# Patient Record
Sex: FEMALE | HISPANIC OR LATINO | URBAN - METROPOLITAN AREA
[De-identification: names, ages, dates, MRNs, and addresses within clinical notes are randomized per-mention and may not be internally consistent; named-entity substitution may affect disease eponyms.]

---

## 2017-01-05 ENCOUNTER — OFFICE (OUTPATIENT)
Dept: URBAN - METROPOLITAN AREA CLINIC 35 | Facility: CLINIC | Age: 67
Setting detail: OPHTHALMOLOGY
End: 2017-01-05
Payer: MEDICARE

## 2017-01-05 DIAGNOSIS — H01.004: ICD-10-CM

## 2017-01-05 DIAGNOSIS — H01.001: ICD-10-CM

## 2017-01-05 PROBLEM — H10.9 CONJUNCTIVITIS, UNSPECIFIED: Status: RESOLVED | Noted: 2017-01-05 | Resolved: 2017-01-05

## 2017-01-05 PROCEDURE — 92014 COMPRE OPH EXAM EST PT 1/>: CPT | Performed by: OPHTHALMOLOGY

## 2017-01-05 ASSESSMENT — REFRACTION_AUTOREFRACTION
OD_SPHERE: +3.25
OD_AXIS: 176
OS_CYLINDER: +1.50
OD_CYLINDER: +1.25
OS_SPHERE: +3.25
OS_AXIS: 6

## 2017-01-05 ASSESSMENT — REFRACTION_MANIFEST
OD_VA1: 20/20-2
OU_VA: 20/
OS_VA3: 20/
OD_SPHERE: +3.25
OS_SPHERE: +3.75
OS_VA1: 20/
OD_VA1: 20/
OS_CYLINDER: +1.25
OD_VA2: 20/
OU_VA: 20/
OS_VA3: 20/
OD_VA1: 20/
OS_VA1: 20/25
OU_VA: 20/
OS_VA2: 20/
OD_VA3: 20/
OD_AXIS: 180
OD_VA3: 20/
OS_AXIS: 5
OD_VA2: 20/
OD_VA2: 20/
OS_VA1: 20/
OS_VA3: 20/
OS_VA2: 20/
OD_VA3: 20/
OD_CYLINDER: +1.00
OS_VA2: 20/

## 2017-01-05 ASSESSMENT — REFRACTION_CURRENTRX
OS_OVR_VA: 20/
OD_OVR_VA: 20/
OD_OVR_VA: 20/
OD_SPHERE: +3.25
OS_AXIS: 13
OD_CYLINDER: +1.00
OS_OVR_VA: 20/
OD_AXIS: 178
OS_ADD: +3.00
OS_VPRISM_DIRECTION: PROGS
OD_ADD: +3.00
OS_SPHERE: +3.25
OS_OVR_VA: 20/
OD_VPRISM_DIRECTION: PROGS
OD_OVR_VA: 20/
OS_CYLINDER: +0.75

## 2017-01-05 ASSESSMENT — KERATOMETRY
OS_AXISANGLE_DEGREES: 41
OD_K1POWER_DIOPTERS: 44.50
METHOD_AUTO_MANUAL: AUTO
OD_AXISANGLE_DEGREES: 171
OD_K2POWER_DIOPTERS: 44.75
OS_K1POWER_DIOPTERS: 44.50
OS_K2POWER_DIOPTERS: 45.00

## 2017-01-05 ASSESSMENT — LID EXAM ASSESSMENTS
OS_BLEPHARITIS: LUL T
OD_BLEPHARITIS: RUL T

## 2017-01-05 ASSESSMENT — AXIALLENGTH_DERIVED
OS_AL: 21.61
OS_AL: 21.7333
OD_AL: 21.8556
OD_AL: 21.8139

## 2017-01-05 ASSESSMENT — CONFRONTATIONAL VISUAL FIELD TEST (CVF)
OD_FINDINGS: FULL
OS_FINDINGS: FULL

## 2017-01-05 ASSESSMENT — VISUAL ACUITY
OD_BCVA: 20/20-
OS_BCVA: 20/25+2

## 2017-01-05 ASSESSMENT — SPHEQUIV_DERIVED
OD_SPHEQUIV: 3.75
OS_SPHEQUIV: 4
OS_SPHEQUIV: 4.375
OD_SPHEQUIV: 3.875

## 2017-09-07 ENCOUNTER — HOSPITAL ENCOUNTER (EMERGENCY)
Facility: HOSPITAL | Age: 67
Discharge: HOME | End: 2017-09-07
Attending: EMERGENCY MEDICINE | Admitting: EMERGENCY MEDICINE

## 2017-09-07 ENCOUNTER — APPOINTMENT (EMERGENCY)
Dept: CT IMAGING | Facility: HOSPITAL | Age: 67
End: 2017-09-07

## 2017-09-07 VITALS
WEIGHT: 120 LBS | BODY MASS INDEX: 22.66 KG/M2 | OXYGEN SATURATION: 99 % | TEMPERATURE: 98.3 F | RESPIRATION RATE: 16 BRPM | SYSTOLIC BLOOD PRESSURE: 170 MMHG | HEART RATE: 65 BPM | HEIGHT: 61 IN | DIASTOLIC BLOOD PRESSURE: 81 MMHG

## 2017-09-07 DIAGNOSIS — M53.3 SACROILIAC JOINT PAIN: Primary | ICD-10-CM

## 2017-09-07 DIAGNOSIS — I10 HTN (HYPERTENSION): ICD-10-CM

## 2017-09-07 LAB
ANION GAP SERPL CALCULATED.3IONS-SCNC: 1 MMOL/L (ref 4–13)
ATRIAL RATE: 79 BPM
BASOPHILS # BLD AUTO: 0.01 THOUSANDS/ΜL (ref 0–0.1)
BASOPHILS NFR BLD AUTO: 0 % (ref 0–1)
BILIRUB UR QL STRIP: NEGATIVE
BUN SERPL-MCNC: 15 MG/DL (ref 5–25)
CALCIUM SERPL-MCNC: 8.8 MG/DL (ref 8.3–10.1)
CHLORIDE SERPL-SCNC: 104 MMOL/L (ref 100–108)
CLARITY UR: CLEAR
CO2 SERPL-SCNC: 32 MMOL/L (ref 21–32)
COLOR UR: YELLOW
COLOR, POC: YELLOW
CREAT SERPL-MCNC: 0.88 MG/DL (ref 0.6–1.3)
EOSINOPHIL # BLD AUTO: 0.13 THOUSAND/ΜL (ref 0–0.61)
EOSINOPHIL NFR BLD AUTO: 3 % (ref 0–6)
ERYTHROCYTE [DISTWIDTH] IN BLOOD BY AUTOMATED COUNT: 13 % (ref 11.6–15.1)
GFR SERPL CREATININE-BSD FRML MDRD: 68 ML/MIN/1.73SQ M
GLUCOSE SERPL-MCNC: 86 MG/DL (ref 65–140)
GLUCOSE UR STRIP-MCNC: NEGATIVE MG/DL
HCT VFR BLD AUTO: 38.8 % (ref 34.8–46.1)
HGB BLD-MCNC: 13.7 G/DL (ref 11.5–15.4)
HGB UR QL STRIP.AUTO: NEGATIVE
KETONES UR STRIP-MCNC: NEGATIVE MG/DL
LEUKOCYTE ESTERASE UR QL STRIP: NEGATIVE
LYMPHOCYTES # BLD AUTO: 1.57 THOUSANDS/ΜL (ref 0.6–4.47)
LYMPHOCYTES NFR BLD AUTO: 31 % (ref 14–44)
MCH RBC QN AUTO: 32.5 PG (ref 26.8–34.3)
MCHC RBC AUTO-ENTMCNC: 35.3 G/DL (ref 31.4–37.4)
MCV RBC AUTO: 92 FL (ref 82–98)
MONOCYTES # BLD AUTO: 0.47 THOUSAND/ΜL (ref 0.17–1.22)
MONOCYTES NFR BLD AUTO: 9 % (ref 4–12)
NEUTROPHILS # BLD AUTO: 2.97 THOUSANDS/ΜL (ref 1.85–7.62)
NEUTS SEG NFR BLD AUTO: 57 % (ref 43–75)
NITRITE UR QL STRIP: NEGATIVE
NRBC BLD AUTO-RTO: 0 /100 WBCS
P AXIS: 67 DEGREES
PH UR STRIP.AUTO: 7 [PH] (ref 4.5–8)
PLATELET # BLD AUTO: 199 THOUSANDS/UL (ref 149–390)
PMV BLD AUTO: 11.6 FL (ref 8.9–12.7)
POTASSIUM SERPL-SCNC: 4.1 MMOL/L (ref 3.5–5.3)
POTASSIUM SERPL-SCNC: 5.7 MMOL/L (ref 3.5–5.3)
PR INTERVAL: 122 MS
PROT UR STRIP-MCNC: NEGATIVE MG/DL
QRS AXIS: 87 DEGREES
QRSD INTERVAL: 72 MS
QT INTERVAL: 386 MS
QTC INTERVAL: 442 MS
RBC # BLD AUTO: 4.22 MILLION/UL (ref 3.81–5.12)
SODIUM SERPL-SCNC: 137 MMOL/L (ref 136–145)
SP GR UR STRIP.AUTO: 1.02 (ref 1–1.03)
T WAVE AXIS: 71 DEGREES
UROBILINOGEN UR QL STRIP.AUTO: 0.2 E.U./DL
VENTRICULAR RATE: 79 BPM
WBC # BLD AUTO: 5.15 THOUSAND/UL (ref 4.31–10.16)

## 2017-09-07 PROCEDURE — 36415 COLL VENOUS BLD VENIPUNCTURE: CPT | Performed by: FAMILY MEDICINE

## 2017-09-07 PROCEDURE — 74177 CT ABD & PELVIS W/CONTRAST: CPT

## 2017-09-07 PROCEDURE — 93005 ELECTROCARDIOGRAM TRACING: CPT | Performed by: FAMILY MEDICINE

## 2017-09-07 PROCEDURE — 99284 EMERGENCY DEPT VISIT MOD MDM: CPT

## 2017-09-07 PROCEDURE — 96374 THER/PROPH/DIAG INJ IV PUSH: CPT

## 2017-09-07 PROCEDURE — 81003 URINALYSIS AUTO W/O SCOPE: CPT

## 2017-09-07 PROCEDURE — 81002 URINALYSIS NONAUTO W/O SCOPE: CPT | Performed by: FAMILY MEDICINE

## 2017-09-07 PROCEDURE — 84132 ASSAY OF SERUM POTASSIUM: CPT | Performed by: FAMILY MEDICINE

## 2017-09-07 PROCEDURE — 85025 COMPLETE CBC W/AUTO DIFF WBC: CPT | Performed by: FAMILY MEDICINE

## 2017-09-07 PROCEDURE — 80048 BASIC METABOLIC PNL TOTAL CA: CPT | Performed by: FAMILY MEDICINE

## 2017-09-07 RX ORDER — NAPROXEN 500 MG/1
500 TABLET ORAL 2 TIMES DAILY WITH MEALS
Qty: 10 TABLET | Refills: 0 | Status: SHIPPED | OUTPATIENT
Start: 2017-09-07 | End: 2017-12-18

## 2017-09-07 RX ORDER — HYDROCHLOROTHIAZIDE 12.5 MG/1
12.5 TABLET ORAL DAILY
COMMUNITY
End: 2017-12-18

## 2017-09-07 RX ORDER — KETOROLAC TROMETHAMINE 30 MG/ML
15 INJECTION, SOLUTION INTRAMUSCULAR; INTRAVENOUS ONCE
Status: COMPLETED | OUTPATIENT
Start: 2017-09-07 | End: 2017-09-07

## 2017-09-07 RX ORDER — ASPIRIN 81 MG/1
81 TABLET ORAL DAILY
COMMUNITY

## 2017-09-07 RX ADMIN — KETOROLAC TROMETHAMINE 15 MG: 30 INJECTION, SOLUTION INTRAMUSCULAR at 11:55

## 2017-09-07 RX ADMIN — IOHEXOL 100 ML: 350 INJECTION, SOLUTION INTRAVENOUS at 12:19

## 2017-09-12 ENCOUNTER — ALLSCRIPTS OFFICE VISIT (OUTPATIENT)
Dept: OTHER | Facility: OTHER | Age: 67
End: 2017-09-12

## 2017-09-12 DIAGNOSIS — I10 ESSENTIAL (PRIMARY) HYPERTENSION: ICD-10-CM

## 2017-09-12 DIAGNOSIS — Q74.2 OTHER CONGENITAL MALFORMATIONS OF LOWER LIMB(S), INCLUDING PELVIC GIRDLE: ICD-10-CM

## 2017-09-15 ENCOUNTER — HOSPITAL ENCOUNTER (OUTPATIENT)
Dept: RADIOLOGY | Facility: HOSPITAL | Age: 67
Discharge: HOME | End: 2017-09-15

## 2017-09-15 ENCOUNTER — APPOINTMENT (OUTPATIENT)
Dept: LAB | Facility: HOSPITAL | Age: 67
End: 2017-09-15

## 2017-09-15 DIAGNOSIS — Q74.2 OTHER CONGENITAL MALFORMATIONS OF LOWER LIMB(S), INCLUDING PELVIC GIRDLE: ICD-10-CM

## 2017-09-15 DIAGNOSIS — I10 ESSENTIAL (PRIMARY) HYPERTENSION: ICD-10-CM

## 2017-09-15 LAB
ALBUMIN SERPL BCP-MCNC: 4 G/DL (ref 3.5–5)
ALP SERPL-CCNC: 56 U/L (ref 46–116)
ALT SERPL W P-5'-P-CCNC: 35 U/L (ref 12–78)
ANION GAP SERPL CALCULATED.3IONS-SCNC: 7 MMOL/L (ref 4–13)
AST SERPL W P-5'-P-CCNC: 30 U/L (ref 5–45)
BACTERIA UR QL AUTO: ABNORMAL /HPF
BASOPHILS # BLD AUTO: 0.01 THOUSANDS/ΜL (ref 0–0.1)
BASOPHILS NFR BLD AUTO: 0 % (ref 0–1)
BILIRUB SERPL-MCNC: 0.63 MG/DL (ref 0.2–1)
BILIRUB UR QL STRIP: NEGATIVE
BUN SERPL-MCNC: 12 MG/DL (ref 5–25)
CALCIUM SERPL-MCNC: 9.3 MG/DL (ref 8.3–10.1)
CHLORIDE SERPL-SCNC: 99 MMOL/L (ref 100–108)
CHOLEST SERPL-MCNC: 208 MG/DL (ref 50–200)
CLARITY UR: CLEAR
CO2 SERPL-SCNC: 31 MMOL/L (ref 21–32)
COLOR UR: ABNORMAL
CREAT SERPL-MCNC: 0.94 MG/DL (ref 0.6–1.3)
CREAT UR-MCNC: 45.5 MG/DL
EOSINOPHIL # BLD AUTO: 0.19 THOUSAND/ΜL (ref 0–0.61)
EOSINOPHIL NFR BLD AUTO: 4 % (ref 0–6)
ERYTHROCYTE [DISTWIDTH] IN BLOOD BY AUTOMATED COUNT: 12.8 % (ref 11.6–15.1)
GFR SERPL CREATININE-BSD FRML MDRD: 63 ML/MIN/1.73SQ M
GLUCOSE P FAST SERPL-MCNC: 94 MG/DL (ref 65–99)
GLUCOSE UR STRIP-MCNC: NEGATIVE MG/DL
HCT VFR BLD AUTO: 41.9 % (ref 34.8–46.1)
HDLC SERPL-MCNC: 60 MG/DL (ref 40–60)
HGB BLD-MCNC: 14 G/DL (ref 11.5–15.4)
HGB UR QL STRIP.AUTO: NEGATIVE
KETONES UR STRIP-MCNC: NEGATIVE MG/DL
LDLC SERPL CALC-MCNC: 135 MG/DL (ref 0–100)
LEUKOCYTE ESTERASE UR QL STRIP: ABNORMAL
LYMPHOCYTES # BLD AUTO: 2.06 THOUSANDS/ΜL (ref 0.6–4.47)
LYMPHOCYTES NFR BLD AUTO: 41 % (ref 14–44)
MCH RBC QN AUTO: 30.8 PG (ref 26.8–34.3)
MCHC RBC AUTO-ENTMCNC: 33.4 G/DL (ref 31.4–37.4)
MCV RBC AUTO: 92 FL (ref 82–98)
MICROALBUMIN UR-MCNC: <5 MG/L (ref 0–20)
MICROALBUMIN/CREAT 24H UR: <11 MG/G CREATININE (ref 0–30)
MONOCYTES # BLD AUTO: 0.56 THOUSAND/ΜL (ref 0.17–1.22)
MONOCYTES NFR BLD AUTO: 11 % (ref 4–12)
NEUTROPHILS # BLD AUTO: 2.18 THOUSANDS/ΜL (ref 1.85–7.62)
NEUTS SEG NFR BLD AUTO: 44 % (ref 43–75)
NITRITE UR QL STRIP: NEGATIVE
NON-SQ EPI CELLS URNS QL MICRO: ABNORMAL /HPF
PH UR STRIP.AUTO: 7.5 [PH] (ref 4.5–8)
PLATELET # BLD AUTO: 225 THOUSANDS/UL (ref 149–390)
PMV BLD AUTO: 11.5 FL (ref 8.9–12.7)
POTASSIUM SERPL-SCNC: 3.9 MMOL/L (ref 3.5–5.3)
PROT SERPL-MCNC: 8.5 G/DL (ref 6.4–8.2)
PROT UR STRIP-MCNC: NEGATIVE MG/DL
RBC # BLD AUTO: 4.54 MILLION/UL (ref 3.81–5.12)
RBC #/AREA URNS AUTO: ABNORMAL /HPF
SODIUM SERPL-SCNC: 137 MMOL/L (ref 136–145)
SP GR UR STRIP.AUTO: 1.01 (ref 1–1.03)
T4 FREE SERPL-MCNC: 1.03 NG/DL (ref 0.76–1.46)
TRIGL SERPL-MCNC: 67 MG/DL
TSH SERPL DL<=0.05 MIU/L-ACNC: 5.59 UIU/ML (ref 0.36–3.74)
UROBILINOGEN UR QL STRIP.AUTO: 0.2 E.U./DL
WBC # BLD AUTO: 5 THOUSAND/UL (ref 4.31–10.16)
WBC #/AREA URNS AUTO: ABNORMAL /HPF

## 2017-09-15 PROCEDURE — 36415 COLL VENOUS BLD VENIPUNCTURE: CPT

## 2017-09-15 PROCEDURE — 73552 X-RAY EXAM OF FEMUR 2/>: CPT

## 2017-09-15 PROCEDURE — 84439 ASSAY OF FREE THYROXINE: CPT

## 2017-09-15 PROCEDURE — 85025 COMPLETE CBC W/AUTO DIFF WBC: CPT

## 2017-09-15 PROCEDURE — 81001 URINALYSIS AUTO W/SCOPE: CPT

## 2017-09-15 PROCEDURE — 82570 ASSAY OF URINE CREATININE: CPT

## 2017-09-15 PROCEDURE — 84443 ASSAY THYROID STIM HORMONE: CPT

## 2017-09-15 PROCEDURE — 80061 LIPID PANEL: CPT

## 2017-09-15 PROCEDURE — 80053 COMPREHEN METABOLIC PANEL: CPT

## 2017-09-15 PROCEDURE — 82043 UR ALBUMIN QUANTITATIVE: CPT

## 2017-09-19 ENCOUNTER — GENERIC CONVERSION - ENCOUNTER (OUTPATIENT)
Dept: OTHER | Facility: OTHER | Age: 67
End: 2017-09-19

## 2017-12-18 ENCOUNTER — HOSPITAL ENCOUNTER (EMERGENCY)
Facility: HOSPITAL | Age: 67
Discharge: HOME | End: 2017-12-18
Attending: EMERGENCY MEDICINE | Admitting: EMERGENCY MEDICINE

## 2017-12-18 VITALS
HEART RATE: 70 BPM | OXYGEN SATURATION: 100 % | RESPIRATION RATE: 16 BRPM | DIASTOLIC BLOOD PRESSURE: 68 MMHG | WEIGHT: 127 LBS | TEMPERATURE: 97.7 F | SYSTOLIC BLOOD PRESSURE: 147 MMHG | BODY MASS INDEX: 24 KG/M2

## 2017-12-18 DIAGNOSIS — L25.2: Primary | ICD-10-CM

## 2017-12-18 PROCEDURE — 99282 EMERGENCY DEPT VISIT SF MDM: CPT

## 2017-12-18 RX ORDER — PREDNISONE 20 MG/1
60 TABLET ORAL ONCE
Status: COMPLETED | OUTPATIENT
Start: 2017-12-18 | End: 2017-12-18

## 2017-12-18 RX ORDER — IRBESARTAN AND HYDROCHLOROTHIAZIDE 300; 12.5 MG/1; MG/1
1 TABLET, FILM COATED ORAL DAILY
COMMUNITY
Start: 2017-09-12

## 2017-12-18 RX ORDER — PREDNISONE 20 MG/1
TABLET ORAL
Qty: 18 TABLET | Refills: 0 | Status: SHIPPED | OUTPATIENT
Start: 2017-12-18

## 2017-12-18 RX ADMIN — PREDNISONE 60 MG: 20 TABLET ORAL at 09:44

## 2017-12-18 NOTE — ED PROVIDER NOTES
History  Chief Complaint   Patient presents with    Rash     Started 3 days ago  80 YO female presents with an itching rash to the scalp and the back  Pt states this began 3 days PTA when she was using a hair dye  Pt denies pain to the area, states she did use this dye one time prior without issues  Pt denies fevers or chills, no weeping from the rash  Denies similar symptoms in the past  Pt denies CP/SOB/F/C/N/V/D/C, no dysuria, burning on urination or blood in urine  History provided by:  Patient   used: No    Rash   Location:  Head/neck and torso  Head/neck rash location:  Scalp  Torso rash location:  Upper back  Quality: itchiness and redness    Severity:  Moderate  Onset quality:  Gradual  Duration:  3 days  Timing:  Constant  Progression:  Unchanged  Chronicity:  New  Context: new detergent/soap    Relieved by:  Nothing  Worsened by:  Nothing  Ineffective treatments:  Antihistamines  Associated symptoms: no abdominal pain, no diarrhea, no fatigue, no fever, no headaches, no joint pain, no shortness of breath, no tongue swelling, not vomiting and not wheezing        Prior to Admission Medications   Prescriptions Last Dose Informant Patient Reported? Taking?   aspirin (ECOTRIN LOW STRENGTH) 81 mg EC tablet   Yes Yes   Sig: Take 81 mg by mouth daily   irbesartan-hydrochlorothiazide (AVALIDE) 300-12 5 MG per tablet   Yes Yes   Sig: Take 1 tablet by mouth daily      Facility-Administered Medications: None       Past Medical History:   Diagnosis Date    Hypertension     Scoliosis        History reviewed  No pertinent surgical history  History reviewed  No pertinent family history  I have reviewed and agree with the history as documented  Social History   Substance Use Topics    Smoking status: Never Smoker    Smokeless tobacco: Never Used    Alcohol use No        Review of Systems   Constitutional: Negative for chills, fatigue and fever     HENT: Negative for dental problem  Eyes: Negative for visual disturbance  Respiratory: Negative for shortness of breath and wheezing  Cardiovascular: Negative for chest pain  Gastrointestinal: Negative for abdominal pain, diarrhea and vomiting  Genitourinary: Negative for dysuria and frequency  Musculoskeletal: Negative for arthralgias  Skin: Positive for rash  Neurological: Negative for dizziness, weakness, light-headedness and headaches  Psychiatric/Behavioral: Negative for agitation, behavioral problems and confusion  All other systems reviewed and are negative  Physical Exam  ED Triage Vitals [12/18/17 0915]   Temperature Pulse Respirations Blood Pressure SpO2   97 7 °F (36 5 °C) 70 16 147/68 100 %      Temp Source Heart Rate Source Patient Position - Orthostatic VS BP Location FiO2 (%)   Oral -- Sitting Left arm --      Pain Score       No Pain           Orthostatic Vital Signs  Vitals:    12/18/17 0915   BP: 147/68   Pulse: 70   Patient Position - Orthostatic VS: Sitting       Physical Exam   Constitutional: She is oriented to person, place, and time  She appears well-developed and well-nourished  HENT:   Head: Normocephalic and atraumatic  Red, pruritic rash to the scalp  Eyes: EOM are normal    Neck: Normal range of motion  Cardiovascular: Normal rate, regular rhythm and normal heart sounds  Pulmonary/Chest: Effort normal and breath sounds normal    Abdominal: Soft  Musculoskeletal: Normal range of motion  Neurological: She is alert and oriented to person, place, and time  Skin: Skin is warm and dry  Mild pruritic rash over the back  Psychiatric: She has a normal mood and affect  Her behavior is normal  Thought content normal    Nursing note and vitals reviewed        ED Medications  Medications   predniSONE tablet 60 mg (60 mg Oral Given 12/18/17 0944)       Diagnostic Studies  Results Reviewed     None                 No orders to display              Procedures  Procedures       Phone Contacts  ED Phone Contact    ED Course  ED Course                                [facility]  Number of Diagnoses or Management Options  Contact dermatitis due to dye: new and does not require workup  Diagnosis management comments: 1  Rash - Pt with rash consistent with a contact dermatitis, seems to be 2/2 hair dye  Will give taper of steroids, recommend PCP follow up  Patient Progress  Patient progress: stable    CritCare Time    Disposition  Final diagnoses:   Contact dermatitis due to dye     Time reflects when diagnosis was documented in both MDM as applicable and the Disposition within this note     Time User Action Codes Description Comment    12/18/2017  9:40 AM Carolann POOL Add [L25 2] Contact dermatitis due to dye       ED Disposition     ED Disposition Condition Comment    Discharge  [facility] discharge to home/self care  Condition at discharge: Stable        Follow-up Information    None       Patient's Medications   Discharge Prescriptions    PREDNISONE 20 MG TABLET    Take 3 tablets for 3 days, then 2 tablets for 3 days, then 1 tablet for 3 days  Start Date: 12/18/2017End Date: --       Order Dose: --       Quantity: 18 tablet    Refills: 0     No discharge procedures on file      ED Provider  Electronically Signed by           Angela Lynch MD  12/18/17 9800

## 2017-12-18 NOTE — DISCHARGE INSTRUCTIONS
[address]  De [address] 80  No use el tinte para el sherrell nuevamente, es probable que cause lew peor reacción con el uso continuo  Hable con el médico de herrera oliva, debe verlo en la oficina para asegurarse de que herrera erupción se aclare  Continúe usando Benadryl para picazón  Dermatitis por contacto   LO QUE NECESITA SABER:   La dermatitis de contacto es un sarpullido  Se desarrolla cuando usted toca algo que irrita herrera piel o que provoca lew reacción alérgica  INSTRUCCIONES SOBRE [facility]:   Llame al 911 en maria m de presentar lo siguiente:   · Usted tiene dificultad repentina para respirar  · Herrera garganta se inflama y tiene dificultad para comer  · Herrera oliva está inflamada  Pregúntele a herrera [place] vitaminas y minerales son adecuados para usted  · Usted tiene fiebre  · Vonnie ampollas están drenando pus  · Herrera sarpullido se propaga o no mejora aún después del tratamiento  · Usted tiene preguntas o inquietudes acerca de hererra condición o cuidado  Medicamentos:   · Medicamentos,  ayudan a disminuir la comezón y la inflamación  Los medicamentos serán de uso tópico para aplicarse sobre herrera salpullido o en píldora  · De [place] vonnie medicamentos tyrese se le haya indicado  Consulte con herrera médico si usted elsa que herrera medicamento no le está ayudando o si presenta efectos secundarios  Infórmele si es alérgico a cualquier medicamento  Mantenga lew lista actualizada de los [place], las vitaminas y los productos herbales que jewel  Incluya los siguientes datos de los medicamentos: cantidad, frecuencia y motivo de administración  Traiga con usted la lista o los envases de la píldoras a vonnie citas de seguimiento  Lleve la lista de los medicamentos con usted en maria m de lew emergencia  Controle herrera dermatitis de contacto:   · De [place] janice de key o duchas cortas en agua fría  Use jabón suave o algún limpiador que no tenga arthur Morris de sodio o [place] de maíz al agua de la key para ayudar a disminuir la irritación en la piel  · Evite irritantes de la piel , tyrese [place], productos para el sherrell, jabones y limpiadores  Use productos que no contengan perfume o tintes  · Aplique elw compresa fría a garcia sarpullido  [place] ayudará a aliviar garcia piel  · Mantenga garcia piel húmeda  Frote crema o loción sin perfume en garcia piel para impedir la resequedad y comezón  Gómez esto tan pronto termine de bañarse o ducharse cuando garcia piel aún esté mojada  Programe lew nicole con garcia médico o dermatólogo dentro de 2 a 3 días:  Anote vonnie preguntas para que se acuerde de hacerlas lencho vonnie visitas  © 2017 [employer] Information is for End User's use only and may not be sold, redistributed or otherwise used for commercial purposes  All illustrations and images included in CareNotes® are the copyrighted property of A D A M , Inc  or Brian Glez  Esta información es sólo para uso en educación  Garcia intención no es darle un consejo médico sobre enfermedades o tratamientos  Colsulte con garcia Benuel Ferrera farmacéutico antes de seguir cualquier régimen médico para saber si es seguro y efectivo para usted

## 2017-12-18 NOTE — ED NOTES
Pt reports colored hair on Wednesday and reports Friday noticed a red itchy rash around her head, down her neck and pain  Pt denies difficulty breathing   Pt reports taking benadryl with no relief      Beth Rockwell RN  12/18/17 0438

## 2018-01-08 ENCOUNTER — OFFICE (OUTPATIENT)
Dept: URBAN - METROPOLITAN AREA CLINIC 35 | Facility: CLINIC | Age: 68
Setting detail: OPHTHALMOLOGY
End: 2018-01-08
Payer: COMMERCIAL

## 2018-01-08 DIAGNOSIS — H01.001: ICD-10-CM

## 2018-01-08 DIAGNOSIS — H52.4: ICD-10-CM

## 2018-01-08 DIAGNOSIS — H01.004: ICD-10-CM

## 2018-01-08 DIAGNOSIS — E11.9: ICD-10-CM

## 2018-01-08 PROCEDURE — 92015 DETERMINE REFRACTIVE STATE: CPT | Performed by: OPHTHALMOLOGY

## 2018-01-08 PROCEDURE — 92014 COMPRE OPH EXAM EST PT 1/>: CPT | Performed by: OPHTHALMOLOGY

## 2018-01-08 ASSESSMENT — REFRACTION_AUTOREFRACTION
OD_SPHERE: +3.00
OS_AXIS: 005
OD_CYLINDER: +1.25
OS_CYLINDER: +1.00
OS_SPHERE: +3.25
OD_AXIS: 177

## 2018-01-08 ASSESSMENT — REFRACTION_OUTSIDERX
OD_SPHERE: +3.00
OD_VA3: 20/
OS_VA1: 20/20
OS_VA3: 20/
OD_CYLINDER: +0.75
OD_ADD: +3.50
OD_VA1: 20/20
OD_AXIS: 015
OD_VA2: 20/
OU_VA: 20/
OS_AXIS: 015
OS_SPHERE: +3.25
OS_VA2: 20/
OS_ADD: +3.50
OS_CYLINDER: +0.50

## 2018-01-08 ASSESSMENT — REFRACTION_CURRENTRX
OS_VPRISM_DIRECTION: PROGS
OS_CYLINDER: +0.75
OD_OVR_VA: 20/
OS_OVR_VA: 20/
OD_OVR_VA: 20/
OD_CYLINDER: +0.75
OS_SPHERE: +3.25
OS_ADD: +3.00
OD_ADD: +3.00
OS_OVR_VA: 20/
OS_OVR_VA: 20/
OD_AXIS: 117
OD_OVR_VA: 20/
OD_SPHERE: +3.25
OS_AXIS: 016
OD_VPRISM_DIRECTION: PROGS

## 2018-01-08 ASSESSMENT — AXIALLENGTH_DERIVED
OD_AL: 21.7771
OS_AL: 21.7771
OD_AL: 21.8187
OS_AL: 21.5716

## 2018-01-08 ASSESSMENT — KERATOMETRY
OD_K1POWER_DIOPTERS: 44.75
OD_AXISANGLE_DEGREES: 007
METHOD_AUTO_MANUAL: AUTO
OS_AXISANGLE_DEGREES: 061
OS_K2POWER_DIOPTERS: 45.25
OD_K2POWER_DIOPTERS: 45.00
OS_K1POWER_DIOPTERS: 44.50

## 2018-01-08 ASSESSMENT — REFRACTION_MANIFEST
OD_SPHERE: +3.25
OU_VA: 20/
OS_AXIS: 5
OS_CYLINDER: +1.25
OS_VA3: 20/
OD_VA2: 20/
OS_VA1: 20/25
OS_VA3: 20/
OD_VA3: 20/
OS_VA1: 20/
OS_VA2: 20/
OD_VA1: 20/20-2
OU_VA: 20/
OD_CYLINDER: +1.00
OD_AXIS: 180
OD_VA3: 20/
OD_VA2: 20/
OS_VA2: 20/
OS_SPHERE: +3.75
OD_VA1: 20/

## 2018-01-08 ASSESSMENT — SPHEQUIV_DERIVED
OS_SPHEQUIV: 4.375
OD_SPHEQUIV: 3.75
OD_SPHEQUIV: 3.625
OS_SPHEQUIV: 3.75

## 2018-01-08 ASSESSMENT — LID EXAM ASSESSMENTS
OS_BLEPHARITIS: LUL T
OS_COMMENTS: BLEPHAROCHALASIS
OD_COMMENTS: BLEPHAROCHALASIS
OD_BLEPHARITIS: RUL T

## 2018-01-08 ASSESSMENT — CONFRONTATIONAL VISUAL FIELD TEST (CVF)
OS_FINDINGS: FULL
OD_FINDINGS: FULL

## 2018-01-08 ASSESSMENT — VISUAL ACUITY
OS_BCVA: 20/25
OD_BCVA: 20/30-2

## 2018-01-14 VITALS
BODY MASS INDEX: 23.42 KG/M2 | TEMPERATURE: 95.6 F | WEIGHT: 124.06 LBS | HEART RATE: 72 BPM | RESPIRATION RATE: 16 BRPM | OXYGEN SATURATION: 99 % | SYSTOLIC BLOOD PRESSURE: 152 MMHG | DIASTOLIC BLOOD PRESSURE: 80 MMHG | HEIGHT: 61 IN

## 2018-01-15 NOTE — RESULT NOTES
Verified Results  (1) CBC/PLT/DIFF [id number] 09:21AM Mamie Alvarado Order Number: [id number]     Test Name Result Flag Reference   WBC COUNT 5 00 Thousand/uL  4 31-10 16   RBC COUNT 4 54 Million/uL  3 81-5 12   HEMOGLOBIN 14 0 g/dL  11 5-15 4   HEMATOCRIT 41 9 %  34 8-46  1   MCV 92 fL  82-98   MCH 30 8 pg  26 8-34 3   MCHC 33 4 g/dL  31 4-37 4   RDW 12 8 %  11 6-15 1   MPV 11 5 fL  8 9-12 7   PLATELET COUNT 529 Thousands/uL  149-390   NEUTROPHILS RELATIVE PERCENT 44 %  43-75   LYMPHOCYTES RELATIVE PERCENT 41 %  14-44   MONOCYTES RELATIVE PERCENT 11 %  4-12   EOSINOPHILS RELATIVE PERCENT 4 %  0-6   BASOPHILS RELATIVE PERCENT 0 %  0-1   NEUTROPHILS ABSOLUTE COUNT 2 18 Thousands/? ??L  1 85-7 62   LYMPHOCYTES ABSOLUTE COUNT 2 06 Thousands/? ??L  0 60-4 47   MONOCYTES ABSOLUTE COUNT 0 56 Thousand/? ??L  0 17-1 22   EOSINOPHILS ABSOLUTE COUNT 0 19 Thousand/? ??L  0 00-0 61   BASOPHILS ABSOLUTE COUNT 0 01 Thousands/? ??L  0 00-0 10     (1) COMPREHENSIVE METABOLIC PANEL [id number] 06:72QC Mamie Alvarado Order Number: [id number]     Test Name Result Flag Reference   SODIUM 137 mmol/L  136-145   POTASSIUM 3 9 mmol/L  3 5-5 3   CHLORIDE 99 mmol/L L 100-108   CARBON DIOXIDE 31 mmol/L  21-32   ANION GAP (CALC) 7 mmol/L  4-13   BLOOD UREA NITROGEN 12 mg/dL  5-25   CREATININE 0 94 mg/dL  0 60-1 30   Standardized to IDMS reference method   CALCIUM 9 3 mg/dL  8 3-10 1   BILI, TOTAL 0 63 mg/dL  0 20-1 00   ALK PHOSPHATAS 56 U/L     ALT (SGPT) 35 U/L  12-78   Specimen collection should occur prior to Sulfasalazine administration due to the potential for falsely depressed results  AST(SGOT) 30 U/L  5-45   Specimen collection should occur prior to Sulfasalazine administration due to the potential for falsely depressed results     ALBUMIN 4 0 g/dL  3 5-5 0   TOTAL PROTEIN 8 5 g/dL H 6 4-8 2   eGFR 63 ml/min/1 73sq m     National Kidney Disease Education Program recommendations are as follows:  GFR calculation is accurate only with a steady state creatinine  Chronic Kidney disease less than 60 ml/min/1 73 sq  meters  Kidney failure less than 15 ml/min/1 73 sq  meters  GLUCOSE FASTING 94 mg/dL  65-99   Specimen collection should occur prior to Sulfasalazine administration due to the potential for falsely depressed results  Specimen collection should occur prior to Sulfapyridine administration due to the potential for falsely elevated results  (1) LIPID PANEL, FASTING 15Sep2017 09:21AM [employer]olyWriggle Order Number: [id number]     Test Name Result Flag Reference   CHOLESTEROL 208 mg/dL H    HDL,DIRECT 60 mg/dL  40-60   Specimen collection should occur prior to Metamizole administration due to the potential for falsley depressed results  LDL CHOLESTEROL CALCULATED 135 mg/dL H 0-100   Triglyceride:        Normal <150 mg/dl   Borderline High 150-199 mg/dl   High 200-499 mg/dl   Very High >499 mg/dl      Cholesterol:       Desirable <200 mg/dl    Borderline High 200-239 mg/dl    High >239 mg/dl      HDL Cholesterol:       High>59 mg/dL    Low <41 mg/dL      This screening LDL is a calculated result  It does not have the accuracy of the Direct Measured LDL in the monitoring of patients with hyperlipidemia and/or statin therapy  Direct Measure LDL (CIH100) must be ordered separately in these patients  TRIGLYCERIDES 67 mg/dL  <=150   Specimen collection should occur prior to N-Acetylcysteine or Metamizole administration due to the potential for falsely depressed results  (1) TSH WITH FT4 REFLEX 15Sep2017 09:21AM [employer] Order Number: [id number]     Test Name Result Flag Reference   TSH 5 591 uIU/mL H 0 358-3 740   A FT4 has been ordered      Patients undergoing fluorescein dye angiography may retain small amounts of fluorescein in the body for 48-72 hours post procedure  Samples containing fluorescein can produce falsely depressed TSH values   If the patient had this procedure,a specimen should be resubmitted post fluorescein clearance  The recommended reference ranges for TSH during pregnancy are as follows:  First trimester 0 1 to 2 5 uIU/mL  Second trimester  0 2 to 3 0 uIU/mL  Third trimester 0 3 to 3 0 uIU/m     (1) URINALYSIS w URINE C/S REFLEX (will reflex a microscopy if leukocytes, occult blood, or nitrites are not within normal limits) 15Sep2017 09:21AM [employer] Order Number: [id number]     Test Name Result Flag Reference   COLOR Light Yellow     CLARITY Clear     PH UA 7 5  4 5-8 0   LEUKOCYTE ESTERASE UA Small A Negative   NITRITE UA Negative  Negative   PROTEIN UA Negative mg/dl  Negative   GLUCOSE UA Negative mg/dl  Negative   KETONES UA Negative mg/dl  Negative   UROBILINOGEN UA 0 2 E U /dl  0 2, 1 0 E U /dl   BILIRUBIN UA Negative  Negative   BLOOD UA Negative  Negative, Trace-Intact   SPECIFIC GRAVITY UA 1 010  1 003-1 030   BACTERIA None Seen /hpf  None Seen, Occasional   EPITHELIAL CELLS Occasional /hpf  None Seen, Occasional   RBC UA None Seen /hpf  None Seen, 0-5   WBC UA 2-4 /hpf A None Seen, 0-5, 5-55, 5-65     (1) MICROALBUMIN CREATININE RATIO, RANDOM URINE 15Sep2017 09:21AM [employer] Order Number: [id number]     Test Name Result Flag Reference   MICROALBUMIN/ CREAT R <11 mg/g creatinine  0-30   MICROALBUMIN,URINE <5 0 mg/L  0 0-20 0   CREATININE URINE 45 5 mg/dL       (1) TSH WITH FT4 REFLEX 15Sep2017 09:21AM [employer] Order Number: [id number]     Test Name Result Flag Reference   TSH 5 591 uIU/mL H 0 358-3 740   A FT4 has been ordered      Patients undergoing fluorescein dye angiography may retain small amounts of fluorescein in the body for 48-72 hours post procedure  Samples containing fluorescein can produce falsely depressed TSH values  If the patient had this procedure,a specimen should be resubmitted post fluorescein clearance            The recommended reference ranges for TSH during pregnancy are as follows:  First trimester 0 1 to 2 5 uIU/mL  Second trimester  0 2 to 3 0 uIU/mL  Third trimester 0 3 to 3 0 uIU/m   T4,FREE 1 03 ng/dL  0 76-1 46   Specimen collection should occur prior to Sulfasalazine administration due to the potential for falsely elevated results  * XR FEMUR 2 VIEW RIGHT [id number] 09:17AM Erick Minor Order Number: [id number]     Test Name Result Flag Reference   XR FEMUR 2 VW RIGHT (Report)     RIGHT FEMUR     INDICATION: 66-year-old female, posterior thigh pain     COMPARISON: None     VIEWS: AP and lateral;     IMAGES: 4     FINDINGS:     There is no acute fracture or dislocation  Mild degenerative changes at the hip and knee     No lytic or blastic lesions are seen  Intrauterine device  Calcified fibroid  IMPRESSION:   Mild degenerative changes at hip and knee     No acute osseous abnormality         Workstation performed: [id number]     Signed by:   Charlotte Weir MD   9/18/17

## 2019-11-19 ENCOUNTER — OFFICE (OUTPATIENT)
Dept: URBAN - METROPOLITAN AREA CLINIC 35 | Facility: CLINIC | Age: 69
Setting detail: OPHTHALMOLOGY
End: 2019-11-19
Payer: COMMERCIAL

## 2019-11-19 VITALS — HEIGHT: 55 IN

## 2019-11-19 DIAGNOSIS — E11.9: ICD-10-CM

## 2019-11-19 DIAGNOSIS — H52.4: ICD-10-CM

## 2019-11-19 DIAGNOSIS — H25.13: ICD-10-CM

## 2019-11-19 DIAGNOSIS — H40.033: ICD-10-CM

## 2019-11-19 DIAGNOSIS — H18.413: ICD-10-CM

## 2019-11-19 DIAGNOSIS — H16.223: ICD-10-CM

## 2019-11-19 PROCEDURE — 92015 DETERMINE REFRACTIVE STATE: CPT | Performed by: OPHTHALMOLOGY

## 2019-11-19 PROCEDURE — 92014 COMPRE OPH EXAM EST PT 1/>: CPT | Performed by: OPHTHALMOLOGY

## 2019-11-19 PROCEDURE — 92020 GONIOSCOPY: CPT | Performed by: OPHTHALMOLOGY

## 2019-11-19 ASSESSMENT — AXIALLENGTH_DERIVED
OS_AL: 21.7795
OS_AL: 21.821
OS_AL: 21.9467
OD_AL: 21.9419
OD_AL: 22.1117
OD_AL: 22.1546

## 2019-11-19 ASSESSMENT — REFRACTION_MANIFEST
OD_AXIS: 015
OU_VA: 20/
OD_ADD: +3.50
OS_VA2: 20/
OD_ADD: +3.00
OD_CYLINDER: +0.75
OS_CYLINDER: +0.50
OD_VA1: 20/20
OS_AXIS: 015
OD_VA2: 20/
OS_AXIS: 015
OU_VA: 20/
OS_ADD: +3.00
OD_AXIS: 005
OS_CYLINDER: +1.25
OD_SPHERE: +3.00
OS_VA1: 20/20
OD_SPHERE: +2.00
OD_VA1: 20/25
OD_VA3: 20/
OS_VA2: 20/
OS_ADD: +3.50
OS_SPHERE: +2.50
OS_VA3: 20/
OD_VA3: 20/
OS_SPHERE: +3.25
OS_VA1: 20/25-2
OD_VA2: 20/
OD_CYLINDER: +1.50
OS_VA3: 20/

## 2019-11-19 ASSESSMENT — REFRACTION_CURRENTRX
OD_OVR_VA: 20/
OD_AXIS: 021
OD_CYLINDER: +0.50
OD_ADD: +3.50
OD_SPHERE: +3.00
OS_SPHERE: +3.25
OS_ADD: +3.50
OD_VPRISM_DIRECTION: PROGS
OS_CYLINDER: +0.25
OS_VPRISM_DIRECTION: PROGS
OS_OVR_VA: 20/
OS_AXIS: 031

## 2019-11-19 ASSESSMENT — VISUAL ACUITY
OD_BCVA: 20/30
OS_BCVA: 20/30

## 2019-11-19 ASSESSMENT — SUPERFICIAL PUNCTATE KERATITIS (SPK)
OS_SPK: 1+
OD_SPK: 1+

## 2019-11-19 ASSESSMENT — SPHEQUIV_DERIVED
OD_SPHEQUIV: 3.375
OS_SPHEQUIV: 3.125
OS_SPHEQUIV: 3.5
OD_SPHEQUIV: 2.75
OD_SPHEQUIV: 2.875
OS_SPHEQUIV: 3.625

## 2019-11-19 ASSESSMENT — CONFRONTATIONAL VISUAL FIELD TEST (CVF)
OS_FINDINGS: FULL
OD_FINDINGS: FULL

## 2019-11-19 ASSESSMENT — LID EXAM ASSESSMENTS
OD_COMMENTS: BLEPHAROCHALASIS
OS_COMMENTS: BLEPHAROCHALASIS
OS_BLEPHARITIS: LUL T
OD_BLEPHARITIS: RUL T

## 2019-11-19 ASSESSMENT — REFRACTION_AUTOREFRACTION
OS_AXIS: 001
OD_SPHERE: +2.00
OS_SPHERE: +3.00
OS_CYLINDER: +1.25
OD_AXIS: 180
OD_CYLINDER: +1.75

## 2019-11-19 ASSESSMENT — KERATOMETRY
METHOD_AUTO_MANUAL: AUTO
OS_K2POWER_DIOPTERS: 45.25
OS_AXISANGLE_DEGREES: 046
OD_AXISANGLE_DEGREES: 090
OD_K2POWER_DIOPTERS: 44.75
OD_K1POWER_DIOPTERS: 44.75
OS_K1POWER_DIOPTERS: 44.75

## 2020-11-17 ENCOUNTER — OFFICE (OUTPATIENT)
Dept: URBAN - METROPOLITAN AREA CLINIC 27 | Facility: CLINIC | Age: 70
Setting detail: OPHTHALMOLOGY
End: 2020-11-17
Payer: COMMERCIAL

## 2020-11-17 DIAGNOSIS — H40.033: ICD-10-CM

## 2020-11-17 DIAGNOSIS — H16.223: ICD-10-CM

## 2020-11-17 DIAGNOSIS — H25.13: ICD-10-CM

## 2020-11-17 DIAGNOSIS — H18.413: ICD-10-CM

## 2020-11-17 PROCEDURE — 92020 GONIOSCOPY: CPT | Performed by: OPHTHALMOLOGY

## 2020-11-17 PROCEDURE — 92012 INTRM OPH EXAM EST PATIENT: CPT | Performed by: OPHTHALMOLOGY

## 2020-11-17 ASSESSMENT — SPHEQUIV_DERIVED
OS_SPHEQUIV: 3.125
OD_SPHEQUIV: 2.75
OD_SPHEQUIV: 2.75
OS_SPHEQUIV: 3.5
OD_SPHEQUIV: 3.375
OS_SPHEQUIV: 3.125

## 2020-11-17 ASSESSMENT — KERATOMETRY
OD_K2POWER_DIOPTERS: 44.75
OS_AXISANGLE_DEGREES: 034
OS_K1POWER_DIOPTERS: 45.00
OS_K2POWER_DIOPTERS: 45.50
OD_AXISANGLE_DEGREES: 056
OD_K1POWER_DIOPTERS: 44.50
METHOD_AUTO_MANUAL: AUTO

## 2020-11-17 ASSESSMENT — REFRACTION_MANIFEST
OS_ADD: +3.50
OD_AXIS: 005
OS_AXIS: 015
OS_VA1: 20/20
OS_SPHERE: +3.25
OD_CYLINDER: +0.75
OD_ADD: +3.50
OD_CYLINDER: +1.50
OS_VA1: 20/25-2
OS_ADD: +3.00
OS_CYLINDER: +1.25
OD_SPHERE: +2.00
OS_AXIS: 015
OD_ADD: +3.00
OS_CYLINDER: +0.50
OD_VA1: 20/25
OD_SPHERE: +3.00
OD_VA1: 20/20
OS_SPHERE: +2.50
OD_AXIS: 015

## 2020-11-17 ASSESSMENT — VISUAL ACUITY
OD_BCVA: 20/20-1
OS_BCVA: 20/20-2

## 2020-11-17 ASSESSMENT — TONOMETRY
OS_IOP_MMHG: 17
OD_IOP_MMHG: 15
OS_IOP_MMHG: 15
OD_IOP_MMHG: 16

## 2020-11-17 ASSESSMENT — REFRACTION_AUTOREFRACTION
OS_SPHERE: +2.50
OD_AXIS: 178
OD_CYLINDER: +1.50
OD_SPHERE: +2.00
OS_CYLINDER: +1.25
OS_AXIS: 002

## 2020-11-17 ASSESSMENT — LID EXAM ASSESSMENTS
OD_COMMENTS: BLEPHAROCHALASIS
OD_BLEPHARITIS: RUL T
OS_COMMENTS: BLEPHAROCHALASIS
OS_BLEPHARITIS: LUL T

## 2020-11-17 ASSESSMENT — REFRACTION_CURRENTRX
OD_CYLINDER: +0.50
OD_AXIS: 021
OS_SPHERE: +3.25
OD_VPRISM_DIRECTION: PROGS
OS_VPRISM_DIRECTION: PROGS
OS_OVR_VA: 20/
OS_AXIS: 021
OD_SPHERE: +3.00
OD_ADD: +3.50
OS_CYLINDER: +0.25
OD_OVR_VA: 20/
OS_ADD: +3.50

## 2020-11-17 ASSESSMENT — AXIALLENGTH_DERIVED
OD_AL: 21.9817
OD_AL: 22.1952
OS_AL: 21.7428
OD_AL: 22.1952
OS_AL: 21.8676
OS_AL: 21.8676

## 2020-11-17 ASSESSMENT — SUPERFICIAL PUNCTATE KERATITIS (SPK)
OS_SPK: 1+
OD_SPK: 1+

## 2020-11-30 ENCOUNTER — AMBUL SURGICAL CARE (OUTPATIENT)
Dept: URBAN - METROPOLITAN AREA SURGERY 1 | Facility: SURGERY | Age: 70
Setting detail: OPHTHALMOLOGY
End: 2020-11-30
Payer: COMMERCIAL

## 2020-11-30 DIAGNOSIS — H40.031: ICD-10-CM

## 2020-11-30 PROCEDURE — 66761 REVISION OF IRIS: CPT | Performed by: OPHTHALMOLOGY

## 2020-12-03 ENCOUNTER — AMBUL SURGICAL CARE (OUTPATIENT)
Dept: URBAN - METROPOLITAN AREA SURGERY 1 | Facility: SURGERY | Age: 70
Setting detail: OPHTHALMOLOGY
End: 2020-12-03
Payer: COMMERCIAL

## 2020-12-03 DIAGNOSIS — H40.032: ICD-10-CM

## 2020-12-03 PROCEDURE — 66761 REVISION OF IRIS: CPT | Performed by: OPHTHALMOLOGY

## 2020-12-10 ENCOUNTER — RX ONLY (RX ONLY)
Age: 70
End: 2020-12-10

## 2020-12-10 ENCOUNTER — OFFICE (OUTPATIENT)
Dept: URBAN - METROPOLITAN AREA CLINIC 27 | Facility: CLINIC | Age: 70
Setting detail: OPHTHALMOLOGY
End: 2020-12-10
Payer: COMMERCIAL

## 2020-12-10 DIAGNOSIS — H40.033: ICD-10-CM

## 2020-12-10 PROCEDURE — 99024 POSTOP FOLLOW-UP VISIT: CPT | Performed by: OPHTHALMOLOGY

## 2020-12-10 ASSESSMENT — TONOMETRY
OS_IOP_MMHG: 16
OD_IOP_MMHG: 16
OD_IOP_MMHG: 21
OS_IOP_MMHG: 16

## 2020-12-10 ASSESSMENT — LID EXAM ASSESSMENTS
OD_BLEPHARITIS: RUL T
OS_COMMENTS: BLEPHAROCHALASIS
OD_COMMENTS: BLEPHAROCHALASIS
OS_BLEPHARITIS: LUL T

## 2020-12-10 ASSESSMENT — VISUAL ACUITY
OS_BCVA: 20/20-1
OD_BCVA: 20/20-2

## 2020-12-10 ASSESSMENT — SPHEQUIV_DERIVED
OS_SPHEQUIV: 3.125
OS_SPHEQUIV: 3.125
OD_SPHEQUIV: 2.875
OD_SPHEQUIV: 3.375
OS_SPHEQUIV: 3.5
OD_SPHEQUIV: 2.75

## 2020-12-10 ASSESSMENT — REFRACTION_AUTOREFRACTION
OD_SPHERE: +2.00
OS_CYLINDER: +1.75
OD_CYLINDER: +1.75
OS_AXIS: 177
OD_AXIS: 002
OS_SPHERE: +2.25

## 2020-12-10 ASSESSMENT — REFRACTION_MANIFEST
OD_VA1: 20/20
OS_ADD: +3.50
OS_SPHERE: +2.50
OD_CYLINDER: +0.75
OS_CYLINDER: +0.50
OD_SPHERE: +3.00
OD_VA1: 20/25
OS_VA1: 20/25-2
OD_CYLINDER: +1.50
OS_AXIS: 015
OS_AXIS: 015
OS_VA1: 20/20
OS_CYLINDER: +1.25
OD_SPHERE: +2.00
OD_AXIS: 005
OS_SPHERE: +3.25
OD_ADD: +3.00
OD_AXIS: 015
OS_ADD: +3.00
OD_ADD: +3.50

## 2020-12-10 ASSESSMENT — AXIALLENGTH_DERIVED
OD_AL: 22.0216
OS_AL: 22.0666
OS_AL: 21.9395
OS_AL: 22.0666
OD_AL: 22.2359
OD_AL: 22.1927

## 2020-12-10 ASSESSMENT — REFRACTION_CURRENTRX
OS_OVR_VA: 20/
OS_VPRISM_DIRECTION: PROGS
OS_SPHERE: +3.25
OD_SPHERE: +3.00
OS_CYLINDER: +0.25
OS_ADD: +3.50
OD_CYLINDER: +0.50
OD_VPRISM_DIRECTION: PROGS
OS_AXIS: 021
OD_ADD: +3.50
OD_OVR_VA: 20/
OD_AXIS: 021

## 2020-12-10 ASSESSMENT — KERATOMETRY
OS_K1POWER_DIOPTERS: 44.50
OD_K1POWER_DIOPTERS: 44.25
METHOD_AUTO_MANUAL: AUTO
OS_AXISANGLE_DEGREES: 049
OD_K2POWER_DIOPTERS: 44.75
OS_K2POWER_DIOPTERS: 44.75
OD_AXISANGLE_DEGREES: 034

## 2020-12-10 ASSESSMENT — SUPERFICIAL PUNCTATE KERATITIS (SPK)
OD_SPK: 1+
OS_SPK: 1+

## 2021-03-11 ENCOUNTER — OFFICE (OUTPATIENT)
Dept: URBAN - METROPOLITAN AREA CLINIC 27 | Facility: CLINIC | Age: 71
Setting detail: OPHTHALMOLOGY
End: 2021-03-11
Payer: COMMERCIAL

## 2021-03-11 DIAGNOSIS — H40.033: ICD-10-CM

## 2021-03-11 DIAGNOSIS — H01.004: ICD-10-CM

## 2021-03-11 DIAGNOSIS — H25.13: ICD-10-CM

## 2021-03-11 DIAGNOSIS — H18.413: ICD-10-CM

## 2021-03-11 DIAGNOSIS — E11.9: ICD-10-CM

## 2021-03-11 DIAGNOSIS — H16.223: ICD-10-CM

## 2021-03-11 DIAGNOSIS — H01.001: ICD-10-CM

## 2021-03-11 PROCEDURE — 92014 COMPRE OPH EXAM EST PT 1/>: CPT | Performed by: OPHTHALMOLOGY

## 2021-03-11 ASSESSMENT — SPHEQUIV_DERIVED
OS_SPHEQUIV: 3.5
OD_SPHEQUIV: 2.75
OS_SPHEQUIV: 3.125
OD_SPHEQUIV: 2.75
OD_SPHEQUIV: 3.375
OS_SPHEQUIV: 3.375

## 2021-03-11 ASSESSMENT — REFRACTION_MANIFEST
OS_VA1: 20/20
OD_CYLINDER: +1.50
OD_SPHERE: +2.00
OD_VA1: 20/20
OD_ADD: +3.50
OS_SPHERE: +3.25
OD_VA1: 20/25
OD_SPHERE: +3.00
OD_AXIS: 005
OS_AXIS: 015
OS_CYLINDER: +1.25
OS_CYLINDER: +0.50
OD_CYLINDER: +0.75
OS_AXIS: 015
OS_ADD: +3.00
OS_SPHERE: +2.50
OS_VA1: 20/25-2
OS_ADD: +3.50
OD_AXIS: 015
OD_ADD: +3.00

## 2021-03-11 ASSESSMENT — REFRACTION_CURRENTRX
OS_SPHERE: +3.25
OS_AXIS: 021
OS_VPRISM_DIRECTION: PROGS
OD_CYLINDER: +0.50
OD_VPRISM_DIRECTION: PROGS
OS_ADD: +3.50
OD_ADD: +3.50
OS_OVR_VA: 20/
OD_SPHERE: +3.00
OD_AXIS: 021
OD_OVR_VA: 20/
OS_CYLINDER: +0.25

## 2021-03-11 ASSESSMENT — REFRACTION_AUTOREFRACTION
OS_AXIS: 179
OD_SPHERE: +2.00
OS_SPHERE: +2.75
OD_AXIS: 178
OD_CYLINDER: +1.50
OS_CYLINDER: +1.25

## 2021-03-11 ASSESSMENT — AXIALLENGTH_DERIVED
OD_AL: 21.9419
OD_AL: 22.1546
OD_AL: 22.1546
OS_AL: 21.9395
OS_AL: 22.0666
OS_AL: 21.9817

## 2021-03-11 ASSESSMENT — KERATOMETRY
METHOD_AUTO_MANUAL: AUTO
OD_AXISANGLE_DEGREES: 010
OS_K1POWER_DIOPTERS: 44.25
OS_K2POWER_DIOPTERS: 45.00
OD_K1POWER_DIOPTERS: 44.50
OS_AXISANGLE_DEGREES: 026
OD_K2POWER_DIOPTERS: 45.00

## 2021-03-11 ASSESSMENT — LID EXAM ASSESSMENTS
OS_BLEPHARITIS: LUL T
OD_BLEPHARITIS: RUL T

## 2021-03-11 ASSESSMENT — TONOMETRY
OD_IOP_MMHG: 20
OS_IOP_MMHG: 18

## 2021-03-11 ASSESSMENT — VISUAL ACUITY
OS_BCVA: 20/20
OD_BCVA: 20/20-2

## 2021-03-11 ASSESSMENT — CONFRONTATIONAL VISUAL FIELD TEST (CVF)
OD_FINDINGS: FULL
OS_FINDINGS: FULL

## 2021-03-11 ASSESSMENT — SUPERFICIAL PUNCTATE KERATITIS (SPK)
OD_SPK: 1+
OS_SPK: 1+

## 2022-03-10 ENCOUNTER — OFFICE (OUTPATIENT)
Dept: URBAN - METROPOLITAN AREA CLINIC 27 | Facility: CLINIC | Age: 72
Setting detail: OPHTHALMOLOGY
End: 2022-03-10
Payer: MEDICARE

## 2022-03-10 DIAGNOSIS — H25.13: ICD-10-CM

## 2022-03-10 DIAGNOSIS — H18.413: ICD-10-CM

## 2022-03-10 DIAGNOSIS — E11.9: ICD-10-CM

## 2022-03-10 DIAGNOSIS — H40.033: ICD-10-CM

## 2022-03-10 DIAGNOSIS — H01.004: ICD-10-CM

## 2022-03-10 DIAGNOSIS — H01.001: ICD-10-CM

## 2022-03-10 PROBLEM — H52.7 REFRACTIVE ERROR ; BOTH EYES: Status: ACTIVE | Noted: 2020-11-17

## 2022-03-10 PROCEDURE — 92014 COMPRE OPH EXAM EST PT 1/>: CPT | Performed by: OPHTHALMOLOGY

## 2022-03-10 ASSESSMENT — REFRACTION_CURRENTRX
OD_VPRISM_DIRECTION: PROGS
OD_OVR_VA: 20/
OD_AXIS: 021
OD_SPHERE: +3.00
OS_CYLINDER: +0.25
OD_CYLINDER: +0.50
OS_ADD: +3.50
OS_VPRISM_DIRECTION: PROGS
OD_ADD: +3.50
OS_SPHERE: +3.25
OS_AXIS: 021
OS_OVR_VA: 20/

## 2022-03-10 ASSESSMENT — REFRACTION_MANIFEST
OD_SPHERE: +2.00
OS_SPHERE: +3.25
OD_CYLINDER: +0.75
OD_ADD: +3.50
OD_AXIS: 005
OS_CYLINDER: +1.25
OD_VA1: 20/25
OS_AXIS: 015
OD_AXIS: 015
OS_AXIS: 015
OS_ADD: +3.50
OS_VA1: 20/25-2
OS_CYLINDER: +0.50
OS_SPHERE: +2.50
OS_ADD: +3.00
OD_SPHERE: +3.00
OD_ADD: +3.00
OD_CYLINDER: +1.50
OD_VA1: 20/20
OS_VA1: 20/20

## 2022-03-10 ASSESSMENT — VISUAL ACUITY
OS_BCVA: 20/20-1
OD_BCVA: 20/20+1

## 2022-03-10 ASSESSMENT — AXIALLENGTH_DERIVED
OD_AL: 22.0764
OS_AL: 21.789
OS_AL: 21.789
OS_AL: 21.665
OD_AL: 21.8234
OD_AL: 22.0338

## 2022-03-10 ASSESSMENT — SPHEQUIV_DERIVED
OD_SPHEQUIV: 2.75
OS_SPHEQUIV: 3.125
OS_SPHEQUIV: 3.125
OD_SPHEQUIV: 2.625
OS_SPHEQUIV: 3.5
OD_SPHEQUIV: 3.375

## 2022-03-10 ASSESSMENT — REFRACTION_AUTOREFRACTION
OD_CYLINDER: +1.25
OS_AXIS: 1
OS_SPHERE: +2.50
OS_CYLINDER: +1.25
OD_AXIS: 1
OD_SPHERE: +2.00

## 2022-03-10 ASSESSMENT — KERATOMETRY
OD_K2POWER_DIOPTERS: 45.25
OS_K2POWER_DIOPTERS: 45.50
OD_K1POWER_DIOPTERS: 45.00
OS_K1POWER_DIOPTERS: 45.50
METHOD_AUTO_MANUAL: AUTO
OD_AXISANGLE_DEGREES: 16
OS_AXISANGLE_DEGREES: 31

## 2022-03-10 ASSESSMENT — CONFRONTATIONAL VISUAL FIELD TEST (CVF)
OD_FINDINGS: FULL
OS_FINDINGS: FULL

## 2022-03-10 ASSESSMENT — LID EXAM ASSESSMENTS
OS_BLEPHARITIS: LUL T
OD_BLEPHARITIS: RUL T

## 2022-03-10 ASSESSMENT — TONOMETRY
OD_IOP_MMHG: 21
OS_IOP_MMHG: 21